# Patient Record
Sex: FEMALE | Race: WHITE | URBAN - METROPOLITAN AREA
[De-identification: names, ages, dates, MRNs, and addresses within clinical notes are randomized per-mention and may not be internally consistent; named-entity substitution may affect disease eponyms.]

---

## 2019-03-22 ENCOUNTER — NEW PATIENT (OUTPATIENT)
Dept: URBAN - METROPOLITAN AREA CLINIC 30 | Facility: CLINIC | Age: 79
End: 2019-03-22
Payer: COMMERCIAL

## 2019-03-22 DIAGNOSIS — H25.13 AGE-RELATED NUCLEAR CATARACT, BILATERAL: ICD-10-CM

## 2019-03-22 PROCEDURE — 99203 OFFICE O/P NEW LOW 30 MIN: CPT | Performed by: OPHTHALMOLOGY

## 2019-03-22 ASSESSMENT — INTRAOCULAR PRESSURE
OS: 17
OD: 16

## 2019-03-22 ASSESSMENT — KERATOMETRY
OS: 46.20
OD: 46.13

## 2019-08-09 ENCOUNTER — FOLLOW UP ESTABLISHED (OUTPATIENT)
Dept: URBAN - METROPOLITAN AREA CLINIC 30 | Facility: CLINIC | Age: 79
End: 2019-08-09
Payer: COMMERCIAL

## 2019-08-09 PROCEDURE — 92012 INTRM OPH EXAM EST PATIENT: CPT | Performed by: OPHTHALMOLOGY

## 2019-08-09 PROCEDURE — 92083 EXTENDED VISUAL FIELD XM: CPT | Performed by: OPHTHALMOLOGY

## 2019-08-09 PROCEDURE — 92134 CPTRZ OPH DX IMG PST SGM RTA: CPT | Performed by: OPHTHALMOLOGY

## 2019-08-09 ASSESSMENT — INTRAOCULAR PRESSURE
OD: 16
OS: 17

## 2020-12-08 ENCOUNTER — FOLLOW UP ESTABLISHED (OUTPATIENT)
Dept: URBAN - METROPOLITAN AREA CLINIC 30 | Facility: CLINIC | Age: 80
End: 2020-12-08
Payer: COMMERCIAL

## 2020-12-08 DIAGNOSIS — H25.813 COMBINED FORMS OF AGE-RELATED CATARACT, BILATERAL: ICD-10-CM

## 2020-12-08 DIAGNOSIS — H43.393 OTHER VITREOUS OPACITIES, BILATERAL: ICD-10-CM

## 2020-12-08 PROCEDURE — 92134 CPTRZ OPH DX IMG PST SGM RTA: CPT | Performed by: OPHTHALMOLOGY

## 2020-12-08 PROCEDURE — 92014 COMPRE OPH EXAM EST PT 1/>: CPT | Performed by: OPHTHALMOLOGY

## 2020-12-08 ASSESSMENT — VISUAL ACUITY
OD: 20/70
OS: 20/30

## 2020-12-08 ASSESSMENT — KERATOMETRY
OD: 46.02
OS: 46.14

## 2020-12-08 ASSESSMENT — INTRAOCULAR PRESSURE
OD: 18
OS: 19

## 2021-04-14 ENCOUNTER — OFFICE VISIT (OUTPATIENT)
Dept: URBAN - METROPOLITAN AREA CLINIC 30 | Facility: CLINIC | Age: 81
End: 2021-04-14
Payer: COMMERCIAL

## 2021-04-14 DIAGNOSIS — H35.363 DRUSEN (DEGENERATIVE) OF MACULA, BILATERAL: ICD-10-CM

## 2021-04-14 DIAGNOSIS — H04.123 DRY EYE SYNDROME OF BILATERAL LACRIMAL GLANDS: ICD-10-CM

## 2021-04-14 DIAGNOSIS — H40.013 OPEN ANGLE WITH BORDERLINE FINDINGS, LOW RISK, BILATERAL: ICD-10-CM

## 2021-04-14 PROCEDURE — 99214 OFFICE O/P EST MOD 30 MIN: CPT | Performed by: OPHTHALMOLOGY

## 2021-04-14 PROCEDURE — 92134 CPTRZ OPH DX IMG PST SGM RTA: CPT | Performed by: OPHTHALMOLOGY

## 2021-04-14 PROCEDURE — 92133 CPTRZD OPH DX IMG PST SGM ON: CPT | Performed by: OPHTHALMOLOGY

## 2021-04-14 ASSESSMENT — KERATOMETRY
OD: 45.92
OS: 46.49

## 2021-04-14 ASSESSMENT — INTRAOCULAR PRESSURE
OD: 17
OS: 18

## 2021-04-14 ASSESSMENT — VISUAL ACUITY
OS: 20/30
OD: 20/70

## 2021-04-14 NOTE — IMPRESSION/PLAN
Impression: Open angle with borderline findings, low risk, bilateral: H40.013. Plan: Discussed with patient today's findings. Emphasized and explained compliance with medications. Poor compliance can lead to blindness.

## 2021-04-14 NOTE — IMPRESSION/PLAN
Impression: Other vitreous opacities, bilateral: H43.393. Plan: Reviewed with patient . .. Patient will contact the office immediately  if notes any new  vision changes.

## 2021-04-14 NOTE — IMPRESSION/PLAN
Impression: Dry eye syndrome of bilateral lacrimal glands: H04.123. Plan: Discussed daily artificial tears to improve with dry eye, and informed can purchase eyedrops over the counter.

## 2021-04-14 NOTE — IMPRESSION/PLAN
Impression: Age-related nuclear cataract, bilateral: H25.13.  Plan: Progressing but patient defers surgery

## 2021-04-14 NOTE — IMPRESSION/PLAN
Impression: Drusen (degenerative) of macula, bilateral: H35.363. Plan: Reviewed with patient . .. Patient will contact the office immediately  if notes any new  vision changes.

## 2021-10-12 ENCOUNTER — OFFICE VISIT (OUTPATIENT)
Dept: URBAN - METROPOLITAN AREA CLINIC 30 | Facility: CLINIC | Age: 81
End: 2021-10-12
Payer: COMMERCIAL

## 2021-10-12 PROCEDURE — 99214 OFFICE O/P EST MOD 30 MIN: CPT | Performed by: OPHTHALMOLOGY

## 2021-10-12 PROCEDURE — 92134 CPTRZ OPH DX IMG PST SGM RTA: CPT | Performed by: OPHTHALMOLOGY

## 2021-10-12 PROCEDURE — 92133 CPTRZD OPH DX IMG PST SGM ON: CPT | Performed by: OPHTHALMOLOGY

## 2021-10-12 ASSESSMENT — VISUAL ACUITY
OD: 20/60
OS: 20/25

## 2021-10-12 ASSESSMENT — KERATOMETRY
OS: 46.53
OD: 46.08

## 2021-10-12 NOTE — IMPRESSION/PLAN
Impression: Age-related nuclear cataract, bilateral: H25.13. Plan: Discussed cataract with patient. Discussed treatment options. Surgical treatment is recommended. Surgical risk and benefits were discussed. The patient elects to proceed with surgery.   Recommend surgery OD first  .

## 2022-03-10 ENCOUNTER — OFFICE VISIT (OUTPATIENT)
Dept: URBAN - METROPOLITAN AREA CLINIC 30 | Facility: CLINIC | Age: 82
End: 2022-03-10
Payer: COMMERCIAL

## 2022-03-10 DIAGNOSIS — H02.423 MYOGENIC PTOSIS OF BILATERAL EYELIDS: ICD-10-CM

## 2022-03-10 DIAGNOSIS — D31.32 BENIGN NEOPLASM OF LEFT CHOROID: ICD-10-CM

## 2022-03-10 PROCEDURE — 92134 CPTRZ OPH DX IMG PST SGM RTA: CPT | Performed by: OPTOMETRIST

## 2022-03-10 PROCEDURE — 99204 OFFICE O/P NEW MOD 45 MIN: CPT | Performed by: OPTOMETRIST

## 2022-03-10 ASSESSMENT — INTRAOCULAR PRESSURE
OD: 25
OS: 29

## 2022-03-10 ASSESSMENT — VISUAL ACUITY
OS: 20/20
OD: 20/60

## 2022-03-10 ASSESSMENT — KERATOMETRY
OD: 45.97
OS: 46.11

## 2022-03-10 NOTE — IMPRESSION/PLAN
Impression: Age-related nuclear cataract, bilateral: H25.13. Plan: Discussed cataracts with patient. Discussed treatment options. Surgical treatment is recommended. Surgical risks and benefits discussed. Patient elects surgical treatment. Recommend surgery OD only. standard lens, ORA, Aim OD: plano. Patient will need glasses for full time wear. Outcome of surgery limitations include:  Benign neoplasm of left choroid, Myogenic ptosis of bilateral eyelids, Drusen (degenerative) of macula, bilateral. Start ATs.

## 2022-03-10 NOTE — IMPRESSION/PLAN
Impression: Drusen (degenerative) of macula, bilateral: H35.363. Plan: Stable. Mild OU. AREDS 2 reviewed, diet, fish, [place], non-smoking. No subretinal fluid or hemmorhage noted. Monitor with AG. See mac oct for findings.

## 2022-04-19 ENCOUNTER — ADULT PHYSICAL (OUTPATIENT)
Dept: URBAN - METROPOLITAN AREA CLINIC 30 | Facility: CLINIC | Age: 82
End: 2022-04-19
Payer: COMMERCIAL

## 2022-04-19 DIAGNOSIS — H25.813 COMBINED FORMS OF AGE-RELATED CATARACT, BILATERAL: Primary | ICD-10-CM

## 2022-04-19 DIAGNOSIS — Z01.818 ENCOUNTER FOR OTHER PREPROCEDURAL EXAMINATION: Primary | ICD-10-CM

## 2022-04-19 PROCEDURE — 99204 OFFICE O/P NEW MOD 45 MIN: CPT | Performed by: PHYSICIAN ASSISTANT

## 2022-04-19 RX ORDER — METFORMIN HYDROCHLORIDE 500 MG/1
500 MG TABLET, FILM COATED ORAL
Qty: 0 | Refills: 0 | Status: ACTIVE
Start: 2022-04-19

## 2022-04-19 RX ORDER — HYDRALAZINE HYDROCHLORIDE 100 MG/1
100 MG TABLET, FILM COATED ORAL
Qty: 0 | Refills: 0 | Status: ACTIVE
Start: 2022-04-19

## 2022-04-19 RX ORDER — SERTRALINE HYDROCHLORIDE 50 MG/1
50 MG TABLET, FILM COATED ORAL
Qty: 0 | Refills: 0 | Status: ACTIVE
Start: 2022-04-19

## 2022-04-19 ASSESSMENT — PACHYMETRY
OS: 22.20
OS: 2.33
OD: 2.31
OD: 22.18

## 2022-04-28 ENCOUNTER — PRE-OPERATIVE VISIT (OUTPATIENT)
Dept: URBAN - METROPOLITAN AREA CLINIC 10 | Facility: CLINIC | Age: 82
End: 2022-04-28
Payer: COMMERCIAL

## 2022-04-28 PROCEDURE — 99214 OFFICE O/P EST MOD 30 MIN: CPT | Performed by: OPHTHALMOLOGY

## 2022-04-28 NOTE — IMPRESSION/PLAN
Impression: Combined forms of age-related cataract, bilateral: H25.813. Plan: Discussed cataract diagnosis with the patient. Discussed and reviewed treatment options for cataracts. Surgical treatment is required for cataracts. Risks and benefits of surgical treatment were discussed and understood. Patient elects surgical treatment. Recommend surgery OD FOR NOW  PLAN STD LENS, near AIM after surgeon discussion , NO UPGRADES, REVIEWED HANNA, PLAN LRI. PATIENT UNDERSTANDS THE NEED FOR GLASSES POST-OP FOR BEST OVER ALL VISION. Discussed limitations to vision post op due to  SALMA DRUSEN OAG PVD . [place] [facility] . [place]   If first eye doing well, ok to proceed with second eye surgery

## 2022-05-05 ENCOUNTER — SURGERY (OUTPATIENT)
Dept: URBAN - METROPOLITAN AREA SURGERY 5 | Facility: SURGERY | Age: 82
End: 2022-05-05
Payer: COMMERCIAL

## 2022-05-05 DIAGNOSIS — H25.813 COMBINED FORMS OF AGE-RELATED CATARACT, BILATERAL: Primary | ICD-10-CM

## 2022-05-05 DIAGNOSIS — H52.223 REGULAR ASTIGMATISM, BILATERAL: ICD-10-CM

## 2022-05-05 PROCEDURE — 66984 XCAPSL CTRC RMVL W/O ECP: CPT | Performed by: OPHTHALMOLOGY

## 2022-05-06 ENCOUNTER — POST-OPERATIVE VISIT (OUTPATIENT)
Dept: URBAN - METROPOLITAN AREA CLINIC 30 | Facility: CLINIC | Age: 82
End: 2022-05-06
Payer: COMMERCIAL

## 2022-05-06 DIAGNOSIS — Z48.810 ENCOUNTER FOR SURGICAL AFTERCARE FOLLOWING SURGERY ON A SENSE ORGAN: Primary | ICD-10-CM

## 2022-05-06 PROCEDURE — 99024 POSTOP FOLLOW-UP VISIT: CPT | Performed by: OPTOMETRIST

## 2022-05-06 ASSESSMENT — INTRAOCULAR PRESSURE: OD: 20

## 2022-05-06 NOTE — IMPRESSION/PLAN
Impression: S/P Cataract Extraction/IOL; LRI OD - 1 Day. Encounter for surgical aftercare following surgery on a sense organ  Z48.810. Plan: Near aim OD. RTC as sched'd.

## 2022-06-02 ENCOUNTER — POST-OPERATIVE VISIT (OUTPATIENT)
Dept: URBAN - METROPOLITAN AREA CLINIC 30 | Facility: CLINIC | Age: 82
End: 2022-06-02
Payer: COMMERCIAL

## 2022-06-02 DIAGNOSIS — Z48.810 ENCOUNTER FOR SURGICAL AFTERCARE FOLLOWING SURGERY ON THE SENSE ORGANS: Primary | ICD-10-CM

## 2022-06-02 PROCEDURE — 99024 POSTOP FOLLOW-UP VISIT: CPT | Performed by: OPTOMETRIST

## 2022-06-02 ASSESSMENT — VISUAL ACUITY
OS: 20/20
OD: 20/25

## 2022-06-02 ASSESSMENT — INTRAOCULAR PRESSURE: OD: 20

## 2022-06-02 ASSESSMENT — KERATOMETRY
OD: 45.77
OS: 46.30

## 2023-06-02 ENCOUNTER — OFFICE VISIT (OUTPATIENT)
Dept: URBAN - METROPOLITAN AREA CLINIC 30 | Facility: CLINIC | Age: 83
End: 2023-06-02
Payer: MEDICARE

## 2023-06-02 DIAGNOSIS — D31.32 BENIGN NEOPLASM OF LEFT CHOROID: Primary | ICD-10-CM

## 2023-06-02 DIAGNOSIS — H35.363 DRUSEN (DEGENERATIVE) OF MACULA, BILATERAL: ICD-10-CM

## 2023-06-02 DIAGNOSIS — H04.123 DRY EYE SYNDROME OF BILATERAL LACRIMAL GLANDS: ICD-10-CM

## 2023-06-02 DIAGNOSIS — H25.12 AGE-RELATED NUCLEAR CATARACT, LEFT EYE: ICD-10-CM

## 2023-06-02 DIAGNOSIS — H02.423 MYOGENIC PTOSIS OF BILATERAL EYELIDS: ICD-10-CM

## 2023-06-02 PROCEDURE — 92014 COMPRE OPH EXAM EST PT 1/>: CPT | Performed by: OPTOMETRIST

## 2023-06-02 PROCEDURE — 92134 CPTRZ OPH DX IMG PST SGM RTA: CPT | Performed by: OPTOMETRIST

## 2023-06-02 ASSESSMENT — INTRAOCULAR PRESSURE
OD: 18
OS: 23

## 2023-06-02 ASSESSMENT — VISUAL ACUITY
OD: 20/30
OS: 20/30

## 2023-06-02 ASSESSMENT — KERATOMETRY
OS: 46.40
OD: 45.34

## 2023-06-02 NOTE — IMPRESSION/PLAN
Impression: Benign neoplasm of left choroid: D31.32. Plan: Choroidal nevus inf small 0.5DD flat grey. Remains stable. No change in size or other suspicious features noted.

## 2023-06-02 NOTE — IMPRESSION/PLAN
Impression: Drusen (degenerative) of macula, bilateral: H35.363. Plan: Stable. Very mild OU. AREDS 2 reviewed, diet, fish, Amsler, non-smoking. No subretinal fluid or hemmorhage noted. Monitor with AG.  See mac oct findings

## 2023-06-02 NOTE — IMPRESSION/PLAN
Impression: Myogenic ptosis of bilateral eyelids: H02.423. Plan: OD > OS. Consider consult with Oculoplastics for further eval .  Pt defers for now.

## 2024-08-06 ENCOUNTER — OFFICE VISIT (OUTPATIENT)
Dept: URBAN - METROPOLITAN AREA CLINIC 30 | Facility: CLINIC | Age: 84
End: 2024-08-06
Payer: MEDICARE

## 2024-08-06 DIAGNOSIS — H25.12 AGE-RELATED NUCLEAR CATARACT, LEFT EYE: ICD-10-CM

## 2024-08-06 DIAGNOSIS — D31.32 BENIGN NEOPLASM OF LEFT CHOROID: Primary | ICD-10-CM

## 2024-08-06 DIAGNOSIS — H02.423 MYOGENIC PTOSIS OF BILATERAL EYELIDS: ICD-10-CM

## 2024-08-06 DIAGNOSIS — H35.363 DRUSEN (DEGENERATIVE) OF MACULA, BILATERAL: ICD-10-CM

## 2024-08-06 DIAGNOSIS — H04.123 DRY EYE SYNDROME OF BILATERAL LACRIMAL GLANDS: ICD-10-CM

## 2024-08-06 PROCEDURE — 92134 CPTRZ OPH DX IMG PST SGM RTA: CPT | Performed by: OPTOMETRIST

## 2024-08-06 PROCEDURE — 99213 OFFICE O/P EST LOW 20 MIN: CPT | Performed by: OPTOMETRIST

## 2024-08-06 ASSESSMENT — VISUAL ACUITY
OS: 20/25
OD: 20/50

## 2024-08-06 ASSESSMENT — INTRAOCULAR PRESSURE
OS: 19
OD: 20

## 2024-08-06 ASSESSMENT — KERATOMETRY
OD: 46.20
OS: 46.37